# Patient Record
Sex: FEMALE | Race: WHITE | Employment: FULL TIME | URBAN - METROPOLITAN AREA
[De-identification: names, ages, dates, MRNs, and addresses within clinical notes are randomized per-mention and may not be internally consistent; named-entity substitution may affect disease eponyms.]

---

## 2018-04-25 ENCOUNTER — APPOINTMENT (OUTPATIENT)
Dept: ULTRASOUND IMAGING | Facility: CLINIC | Age: 28
End: 2018-04-25
Attending: EMERGENCY MEDICINE
Payer: COMMERCIAL

## 2018-04-25 ENCOUNTER — HOSPITAL ENCOUNTER (EMERGENCY)
Facility: CLINIC | Age: 28
Discharge: HOME | End: 2018-04-25
Attending: EMERGENCY MEDICINE | Admitting: EMERGENCY MEDICINE
Payer: COMMERCIAL

## 2018-04-25 VITALS
HEART RATE: 65 BPM | DIASTOLIC BLOOD PRESSURE: 67 MMHG | TEMPERATURE: 98.1 F | OXYGEN SATURATION: 100 % | RESPIRATION RATE: 20 BRPM | SYSTOLIC BLOOD PRESSURE: 113 MMHG

## 2018-04-25 DIAGNOSIS — O03.9 SPONTANEOUS ABORTION: ICD-10-CM

## 2018-04-25 LAB
ABO + RH BLD: NORMAL
ABO + RH BLD: NORMAL
B-HCG SERPL-ACNC: 4115 IU/L (ref 0–5)
BASOPHILS # BLD AUTO: 0.1 10E9/L (ref 0–0.2)
BASOPHILS NFR BLD AUTO: 0.6 %
BLOOD BANK CMNT PATIENT-IMP: NORMAL
BLOOD BANK CMNT PATIENT-IMP: NORMAL
DIFFERENTIAL METHOD BLD: NORMAL
EOSINOPHIL # BLD AUTO: 0.3 10E9/L (ref 0–0.7)
EOSINOPHIL NFR BLD AUTO: 3.6 %
ERYTHROCYTE [DISTWIDTH] IN BLOOD BY AUTOMATED COUNT: 12.1 % (ref 10–15)
FETAL CELL SCN BLD QL ROSETTE: NORMAL
HCT VFR BLD AUTO: 41.4 % (ref 35–47)
HGB BLD-MCNC: 13.8 G/DL (ref 11.7–15.7)
IMM GRANULOCYTES # BLD: 0 10E9/L (ref 0–0.4)
IMM GRANULOCYTES NFR BLD: 0.2 %
LYMPHOCYTES # BLD AUTO: 3.3 10E9/L (ref 0.8–5.3)
LYMPHOCYTES NFR BLD AUTO: 35 %
MCH RBC QN AUTO: 29.9 PG (ref 26.5–33)
MCHC RBC AUTO-ENTMCNC: 33.3 G/DL (ref 31.5–36.5)
MCV RBC AUTO: 90 FL (ref 78–100)
MONOCYTES # BLD AUTO: 0.6 10E9/L (ref 0–1.3)
MONOCYTES NFR BLD AUTO: 6.7 %
NEUTROPHILS # BLD AUTO: 5.1 10E9/L (ref 1.6–8.3)
NEUTROPHILS NFR BLD AUTO: 53.9 %
NRBC # BLD AUTO: 0 10*3/UL
NRBC BLD AUTO-RTO: 0 /100
PLATELET # BLD AUTO: 237 10E9/L (ref 150–450)
RBC # BLD AUTO: 4.62 10E12/L (ref 3.8–5.2)
RH IG VIALS RECOM PATIENT: NORMAL
WBC # BLD AUTO: 9.4 10E9/L (ref 4–11)

## 2018-04-25 PROCEDURE — 76801 OB US < 14 WKS SINGLE FETUS: CPT

## 2018-04-25 PROCEDURE — 85461 HEMOGLOBIN FETAL: CPT | Performed by: EMERGENCY MEDICINE

## 2018-04-25 PROCEDURE — 86901 BLOOD TYPING SEROLOGIC RH(D): CPT | Performed by: EMERGENCY MEDICINE

## 2018-04-25 PROCEDURE — 85025 COMPLETE CBC W/AUTO DIFF WBC: CPT | Performed by: EMERGENCY MEDICINE

## 2018-04-25 PROCEDURE — 86900 BLOOD TYPING SEROLOGIC ABO: CPT | Performed by: EMERGENCY MEDICINE

## 2018-04-25 PROCEDURE — 25000132 ZZH RX MED GY IP 250 OP 250 PS 637: Performed by: OBSTETRICS & GYNECOLOGY

## 2018-04-25 PROCEDURE — 84702 CHORIONIC GONADOTROPIN TEST: CPT | Performed by: EMERGENCY MEDICINE

## 2018-04-25 PROCEDURE — 99284 EMERGENCY DEPT VISIT MOD MDM: CPT | Mod: 25

## 2018-04-25 PROCEDURE — 25000132 ZZH RX MED GY IP 250 OP 250 PS 637: Performed by: EMERGENCY MEDICINE

## 2018-04-25 RX ORDER — OXYCODONE HYDROCHLORIDE 5 MG/1
5 TABLET ORAL EVERY 6 HOURS PRN
Qty: 12 TABLET | Refills: 0 | Status: ON HOLD | OUTPATIENT
Start: 2018-04-25 | End: 2021-08-05

## 2018-04-25 RX ORDER — MISOPROSTOL 200 UG/1
800 TABLET ORAL ONCE
Status: COMPLETED | OUTPATIENT
Start: 2018-04-25 | End: 2018-04-25

## 2018-04-25 RX ORDER — ACETAMINOPHEN 500 MG
1000 TABLET ORAL ONCE
Status: COMPLETED | OUTPATIENT
Start: 2018-04-25 | End: 2018-04-25

## 2018-04-25 RX ADMIN — ACETAMINOPHEN 1000 MG: 500 TABLET, FILM COATED ORAL at 05:49

## 2018-04-25 RX ADMIN — MISOPROSTOL 800 MCG: 200 TABLET ORAL at 10:20

## 2018-04-25 ASSESSMENT — ENCOUNTER SYMPTOMS
HEADACHES: 0
ABDOMINAL PAIN: 0
FEVER: 0
DIARRHEA: 0
LIGHT-HEADEDNESS: 0
NAUSEA: 0
FREQUENCY: 0
VOMITING: 0
DYSURIA: 0

## 2018-04-25 NOTE — LETTER
April 25, 2018      To Whom It May Concern:      Marce Krishnan was seen in our Emergency Department today, 04/25/18.  I expect her condition to improve over the next 3 days.  She may return to work/school when improved.    Sincerely,        Quan Rodriguez MD

## 2018-04-25 NOTE — H&P
GYN CONSULT    Patient is being seen in consultation at the request of Dr. Rodriguez.    HPI:  Pt is a 28 year old  with known fetal demise (approx 6w2d on last US per pt) who presents to the ER with vaginal bleeding, blood clots and severe cramping.     She has been seen at [facility] and had an US 18 that showed missed  (reports 6w2d with no cardiac activity, though records not available). A repeat US on 18 showed the same. She elected to proceed with expectant mgmt. She began having light bleeding and cramping on , but the bleeding became heavy with clots and possible tissue overnight and cramping and presented to the ER. She denies any dizziness/lightheadedness, SOB or CP. Was changing a heavy pad about once per hour.     OBHx:  1st pregnancy    GynHx: Denies h/o STI, abnl Pap    Med Hx: None  Surg Hx:  None    Meds:  PNV    Allergies: No Known Allergies    Soc Hx:   Lives with . HS . Denies ETOH, drug or tobacco use    Fam Hx: None pertinent    PE:    VS:  BP (!) 106/91  Pulse 73  Temp 98.1  F (36.7  C) (Oral)  Resp 16  SpO2 100%  Gen:  A&O, NAD  Lungs:  CTAB  CV:  RRR  Abd:  Soft, nontender  Pelvic: Per ER MD, os open with clot in vagina, no tissue present. Current pad with small blood, no active bleeding.     Labs:    WBC 9.4  Hgb 13.8  Plt 237  Hcg 4115  Blood type O+    Imaging: Pelvic US:   1. Thickened heterogeneous endometrium and complex material in the cervix.  2. Retained products of conception are possible. Some of this is likely blood products.    A/P:  28 year old  with incomplete SAB.   - US today with EMS of 1.5cm, has likely passed most of the tissue at this time. Discussed options of 1) Medical management with buccal misoprostol to complete SAB process or 2) Surgical management with D&C today. Given that she is clinically stable, I think it is reasonable to use medication and discharge home, which she would prefer. Ok to give  Rx for small number of oxycodone to help with cramping, continue PO tylenol & ibuprofen. Note to be out of work until next week. Call or return if severe pain, bleeding > 1 pad every 30min or s/sx anemia. Will need f/u US in 1 week either in our office or at [facility].    Jeniffer Navarro  4/25/2018  9:47 AM

## 2018-04-25 NOTE — ED PROVIDER NOTES
"  History     Chief Complaint:  \"I am having a miscarriage\"     HPI   Marce Krishnan is a 28-year-old G1 female who presents to the emergency department for evaluation of increased vaginal bleeding and the patient reports that on 4/13 she had her first obstetric ultrasound which revealed no fetal heartbeat and her physician told her she was having a miscarriage.  The patient states that she was given options about what to do and elected to wait on any procedure to see if heartbeat which show up and later ultrasound.  Unfortunately, the patient states that she had a second ultrasound on 4/19 which also revealed no fetal heartbeat.  The patient states that she wanted to try and go about this without any procedures; however, she states that she started to notice increased pain and bleeding tonight.  The patient states that her spotting started 2 days ago.  She states that tonight at 0100 she woke up with intense cramping pelvic pain and went to the bathroom after this.  She states that she was passing a large amount of clots and blood.  Since 0100 the patient states that she has gone through about 5 pads.  Patient took Tylenol for pain yesterday, but did not take anything for pain today.  She denies feeling any lightheadedness, shortness of breath, chest pain.  She has had no fever or urinary symptoms.      Allergies:  No known drug allergies.     Medications:    No daily medications.     Past Medical History:    History reviewed. No pertinent medical history.     Past Surgical History:    Surgical history reviewed. No pertinent surgical history.    Family History:    History reviewed. No pertinent family history.     Social History:  Marital Status:    Presents to the ED with    PCP: [facility]     Review of Systems   Constitutional: Negative for fever.   Gastrointestinal: Negative for abdominal pain, diarrhea, nausea and vomiting.   Genitourinary: Positive for pelvic pain, vaginal " bleeding and vaginal discharge. Negative for dysuria and frequency.   Neurological: Negative for syncope, light-headedness and headaches.   All other systems reviewed and are negative.    Physical Exam     Patient Vitals for the past 24 hrs:   BP Temp Temp src Pulse Resp SpO2   04/25/18 0602 - - - - - 100 %   04/25/18 0600 107/61 - - - - -   04/25/18 0550 103/72 - - - - 100 %   04/25/18 0428 137/75 98.1  F (36.7  C) Oral 77 18 98 %          Physical Exam    Nursing note and vitals reviewed.    Constitutional: Pleasant and well groomed.          HENT:    Mouth/Throat: Oropharynx is without swelling or erythema. Oral mucosa moist.    Eyes: Conjunctivae are normal. No scleral icterus.    Neck: Neck supple.   Cardiovascular: Normal rate, regular rhythm and intact distal pulses.    Pulmonary/Chest: Effort normal and breath sounds normal.   Abdominal: Soft.  No distension. There is no tenderness.   Musculoskeletal:  No edema, No calf tenderness  Genitourinary: Normal external genitalia. Large blood and clots in vaginal vault. Normal appearing cervix. External os open.   Neurological:Alert. Coordination normal.   Skin: Skin is warm and dry.   Psychiatric: Normal mood and affect.     Emergency Department Course   Imaging:  US OB <14 Weeks Single  1. Thickened heterogeneous endometrium and complex material in the cervix.  2. Retained products of conception are possible. Some of this is likely blood products.    Radiographic findings were communicated with the patient who voiced understanding of the findings.    Laboratory:  Blood:  CBC:  WBC 9.4, HGB 13.8, , otherwise WNL  HCG Quantitative Pregnancy: 4115      Rh Immune Globulin Study: O+.     Interventions:  (1293) Tylenol, 1000 mg, PO     Emergency Department Course:  Nursing notes and vitals reviewed.    (4118) I entered the room with my scribe, obtained the history, and performed an exam of the patient as documented above.    A peripheral IV was established. Blood  was drawn from the patient. This was sent for laboratory testing, findings above.      The patient was sent for an obstetric ultrasound while in the emergency department, findings above.      (1859) Patient updated and reevaluated. I repeated the pelvic exam. Still moderate blood and os opened. Plan observation. Dr. Rodriguez will accept ongoing care of the patient.     Findings and plan explained to the patient.       Impression & Plan    Medical Decision Making:  This patient presents to the ED with vaginal bleeding during pregnancy. She reported being diagnosed with an intrauterine fetal demise at about 6 weeks and has had 2 US without cardiac activity. She is not aware of her BhcG and does not believe she had a rhogam shot however her clinic is not open to confirm this information. She denies infertility treatment. The differential diagnosis included but was not limited to spontaneous , ectopic pregnancy,incomplete spontaneous . Her ED evaluation is as noted above. Her Rh is positive. US obtained with results noted above. . At this point I feel that the most likely cause of the vaginal bleeding is ongoing spontaneous . There are no symptoms or findings concerning for ectopic pregnancy. At this point she is incomplete and still bleeding. Plan observation in the ED with likely discharge home and follow up with her OB/Gyn.   Plan:   Refer to Dr. Rodriguez's addendum for final disposition.     Diagnosis:    ICD-10-CM    1. Spontaneous  O03.9        Disposition:  To be determined.             [facility] EMERGENCY DEPARTMENT      Scribe Disclosure:  Bernard SLATER, am serving as a scribe on 2018 at 4:51 AM to personally document services performed by Dr. Vikki Gomez based on my observations and the provider's statements to me.                Vikki Gomez MD  [phone]

## 2018-04-25 NOTE — ED AVS SNAPSHOT
[facility] Emergency Department    [address]    [facility] [address]    Phone:  [phone]    Fax:  [phone]                                       Marce Krishnan   MRN: [id number]    Department:  [facility] Emergency Department   Date of Visit:  4/25/2018           After Visit Summary Signature Page     I have received my discharge instructions, and my questions have been answered. I have discussed any challenges I see with this plan with the nurse or doctor.    ..........................................................................................................................................  Patient/Patient Representative Signature      ..........................................................................................................................................  Patient Representative Print Name and Relationship to Patient    ..................................................               ................................................  Date                                            Time    ..........................................................................................................................................  Reviewed by Signature/Title    ...................................................              ..............................................  Date                                                            Time

## 2018-04-25 NOTE — ED TRIAGE NOTES
Pt to ER with c/o heavy vag bleeding pt was told on April 13 th that she was having a miscarrage, now increased bleeding and pain

## 2018-04-25 NOTE — DISCHARGE INSTRUCTIONS
Discharge Instructions  Vaginal Bleeding in Pregnancy    Bleeding in early pregnancy can be a sign of a miscarriage or an abnormal pregnancy, but often is innocent and the pregnancy will continue normally. We may do blood pregnancy tests and ultrasound to try to determine what is causing the bleeding, but sometimes we are unable to tell. If this is the case, it will often require more time, more blood tests, and another ultrasound.     Generally, every Emergency Department visit should have a follow-up clinic visit with either a primary or a specialty clinic/provider. Please follow-up as instructed by your emergency provider today.    Return to the Emergency Department if:    You have severe abdominal (belly) or pelvic pain.    You faint, or feel lightheaded or dizzy.     Your bleeding gets much worse.    You pass any tissue--solid material that does not look like a blood clot. If you pass tissue, save it (even if you have to pull it out of the toilet) and put it in a plastic bag or jar and bring it in.      You have a fever of 100.5 F or higher.  If no pregnancy could be seen:    It may be that everything is normal and it is just too early to see the pregnancy. It is also possible that you could have an ectopic pregnancy, which is a pregnancy in an abnormal location, such as in the tube ( tubal pregnancy ). We don t see evidence that this is likely today but we cannot exclude it with certainty until a pregnancy can be seen in the uterus (womb) and an ectopic pregnancy can cause severe internal bleeding or death so follow-up is very important.    You should not be alone, in case you suddenly become very sick.     You should not have sex or put anything in your vagina.     If a pregnancy was seen in your uterus:    If a heartbeat could be seen, the chance of miscarriage is lower but because you had bleeding the chance of a miscarriage still exists.    You should not have sex or put anything in your  vagina.    Follow-up as directed with your OB/GYN provider.     Facts about miscarriage: We hope you do not have a miscarriage, but if you do, here are important things to know:    Early miscarriage is very common, and having one miscarriage does not mean you will have problems with another pregnancy.    Nothing you did caused it. Taking medicine, drinking alcohol, having sex, exercising, or falling down will not cause a miscarriage.     If you were given a prescription for medicine here today, be sure to read all of the information (including the package insert) that comes with your prescription.  This will include important information about the medicine, its side effects, and any warnings that you need to know about.  The pharmacist who fills the prescription can provide more information and answer questions you may have about the medicine.  If you have questions or concerns that the pharmacist cannot address, please call or return to the Emergency Department.   Remember that you can always come back to the Emergency Department if you are not able to see your regular provider in the amount of time listed above, if you get any new symptoms, or if there is anything that worries you.

## 2018-04-25 NOTE — ED NOTES
Dr Gomez asked me to make sure this patient was feeling well enough to go home after a period of observation.  She continues to complain of bleeding but not as bad as when she arrived.  She ambulated well.  She asked about contacting OB about possible procedure.  [facility] Ramon came down and evaluated her.  She prescribed an out patient plan to assist patient is completion of her miscarriage and instructions for follow up.     Quan Rodriguez MD  04/25/18 1917

## 2018-04-25 NOTE — ED AVS SNAPSHOT
[facility] Emergency Department    [address]    [facility] [address]    Phone:  [phone]    Fax:  [phone]                                       Marce Krishnan   MRN: [id number]    Department:  [facility] Emergency Department   Date of Visit:  2018           Patient Information     Date Of Birth          1990        Your diagnoses for this visit were:     Spontaneous         You were seen by Vikki Gomez MD and Quan Rodriguez MD.      Follow-up Information     Follow up with [facility] In 1 day.    Contact information:    [address]  [facility] [address]          Follow up with [facility] Emergency Department.    Specialty:  EMERGENCY MEDICINE    Why:  If symptoms worsen    Contact information:    [address]  [facility] [address] [phone]        Discharge Instructions       Discharge Instructions  Vaginal Bleeding in Pregnancy    Bleeding in early pregnancy can be a sign of a miscarriage or an abnormal pregnancy, but often is innocent and the pregnancy will continue normally. We may do blood pregnancy tests and ultrasound to try to determine what is causing the bleeding, but sometimes we are unable to tell. If this is the case, it will often require more time, more blood tests, and another ultrasound.     Generally, every Emergency Department visit should have a follow-up clinic visit with either a primary or a specialty clinic/provider. Please follow-up as instructed by your emergency provider today.    Return to the Emergency Department if:    You have severe abdominal (belly) or pelvic pain.    You faint, or feel lightheaded or dizzy.     Your bleeding gets much worse.    You pass any tissue--solid material that does not look like a blood clot. If you pass tissue, save it (even if you have to pull it out of the toilet) and put it in a plastic bag or jar and bring it  in.      You have a fever of 100.5 F or higher.  If no pregnancy could be seen:    It may be that everything is normal and it is just too early to see the pregnancy. It is also possible that you could have an ectopic pregnancy, which is a pregnancy in an abnormal location, such as in the tube ( tubal pregnancy ). We don t see evidence that this is likely today but we cannot exclude it with certainty until a pregnancy can be seen in the uterus (womb) and an ectopic pregnancy can cause severe internal bleeding or death so follow-up is very important.    You should not be alone, in case you suddenly become very sick.     You should not have sex or put anything in your vagina.     If a pregnancy was seen in your uterus:    If a heartbeat could be seen, the chance of miscarriage is lower but because you had bleeding the chance of a miscarriage still exists.    You should not have sex or put anything in your vagina.    Follow-up as directed with your OB/GYN provider.     Facts about miscarriage: We hope you do not have a miscarriage, but if you do, here are important things to know:    Early miscarriage is very common, and having one miscarriage does not mean you will have problems with another pregnancy.    Nothing you did caused it. Taking medicine, drinking alcohol, having sex, exercising, or falling down will not cause a miscarriage.     If you were given a prescription for medicine here today, be sure to read all of the information (including the package insert) that comes with your prescription.  This will include important information about the medicine, its side effects, and any warnings that you need to know about.  The pharmacist who fills the prescription can provide more information and answer questions you may have about the medicine.  If you have questions or concerns that the pharmacist cannot address, please call or return to the Emergency Department.   Remember that you can always come back to the  Emergency Department if you are not able to see your regular provider in the amount of time listed above, if you get any new symptoms, or if there is anything that worries you.    24 Hour Appointment Hotline       To make an appointment at any [facility], call [phone] ([phone]). If you don't have a family doctor or clinic, we will help you find one. [place] clinics are conveniently located to serve the needs of you and your family.             Review of your medicines      START taking        Dose / Directions Last dose taken    oxyCODONE IR 5 MG tablet   Commonly known as:  ROXICODONE   Dose:  5 mg   Quantity:  12 tablet        Take 1 tablet (5 mg) by mouth every 6 hours as needed for pain   Refills:  0                Information about OPIOIDS     PRESCRIPTION OPIOIDS: WHAT YOU NEED TO KNOW   You have a prescription for an opioid (narcotic) pain medicine. Opioids can cause addiction. If you have a history of chemical dependency of any type, you are at a higher risk of becoming addicted to opioids. Only take this medicine after all other options have been tried. Take it for as short a time and as few doses as possible.     Do not:    Drive. If you drive while taking these medicines, you could be arrested for driving under the influence (DUI).    Operate heavy machinery    Do any other dangerous activities while taking these medicines.     Drink any alcohol while taking these medicines.      Take with any other medicines that contain acetaminophen. Read all labels carefully. Look for the word  acetaminophen  or  Tylenol.  Ask your pharmacist if you have questions or are unsure.    Store your pills in a secure place, locked if possible. We will not replace any lost or stolen medicine. If you don t finish your medicine, please throw away (dispose) as directed by your pharmacist. The [place] [employer]ution [employer] has more information about safe disposal:  https://www.pca.[facility].[place].us/living-green/managing-unwanted-medications    All opioids tend to cause constipation. Drink plenty of water and eat foods that have a lot of fiber, such as fruits, vegetables, prune juice, apple juice and high-fiber cereal. Take a laxative (Miralax, milk of magnesia, Colace, Senna) if you don t move your bowels at least every other day.         Prescriptions were sent or printed at these locations (1 Prescription)                   Other Prescriptions                Printed at Department/Unit printer (1 of 1)         oxyCODONE IR (ROXICODONE) 5 MG tablet                Procedures and tests performed during your visit     CBC with platelets differential    HCG quantitative pregnancy    Patient care order    Rh Immune Globulin Study    Rho (D) immune globulin (RhoGam) Lab Study    US OB < 14 Weeks Single      Orders Needing Specimen Collection     None      Pending Results     No orders found from 4/23/2018 to 4/26/2018.            Pending Culture Results     No orders found from 4/23/2018 to 4/26/2018.            Pending Results Instructions     If you had any lab results that were not finalized at the time of your Discharge, you can call the ED Lab Result RN at [phone]. You will be contacted by this team for any positive Lab results or changes in treatment. The nurses are available 7 days a week from 10A to 6:30P.  You can leave a message 24 hours per day and they will return your call.        Test Results From Your Hospital Stay        4/25/2018  5:02 AM      Component Results     Component Value Ref Range & Units Status    WBC 9.4 4.0 - 11.0 10e9/L Final    RBC Count 4.62 3.8 - 5.2 10e12/L Final    Hemoglobin 13.8 11.7 - 15.7 g/dL Final    Hematocrit 41.4 35.0 - 47.0 % Final    MCV 90 78 - 100 fl Final    MCH 29.9 26.5 - 33.0 pg Final    MCHC 33.3 31.5 - 36.5 g/dL Final    RDW 12.1 10.0 - 15.0 % Final    Platelet Count 237 150 - 450 10e9/L Final    Diff Method Automated Method   Final    % Neutrophils 53.9 % Final    % Lymphocytes 35.0 % Final    % Monocytes 6.7 % Final    % Eosinophils 3.6 % Final    % Basophils 0.6 % Final    % Immature Granulocytes 0.2 % Final    Nucleated RBCs 0 0 /100 Final    Absolute Neutrophil 5.1 1.6 - 8.3 10e9/L Final    Absolute Lymphocytes 3.3 0.8 - 5.3 10e9/L Final    Absolute Monocytes 0.6 0.0 - 1.3 10e9/L Final    Absolute Eosinophils 0.3 0.0 - 0.7 10e9/L Final    Absolute Basophils 0.1 0.0 - 0.2 10e9/L Final    Abs Immature Granulocytes 0.0 0 - 0.4 10e9/L Final    Absolute Nucleated RBC 0.0  Final         4/25/2018  5:31 AM      Component Results     Component Value Ref Range & Units Status    HCG Quantitative Serum 4115 (H) 0 - 5 IU/L Final    Specimen run with a dilution         4/25/2018  5:27 AM      Component Results     Component    Rhogam Order    Order received    Comment:    See Rhogam Study/Suitability               4/25/2018  6:07 AM      Component Results     Component    ABO    O    RH(D)    Pos    Fetal Blood Screen    Canceled, Test credited    Blood Bank Comment    Not suitable for Rh Immune Globulin  PATIENT IS RH POSITIVE      Amount of RHIG Required    Not suitable for Rh Immune Globulin         4/25/2018  7:05 AM      Narrative     US OB <14 WEEKS WITH TRANSVAGINAL SINGLE  4/25/2018 5:48 AM     INDICATION: Vaginal bleeding, known fetal demise.    COMPARISON: None.    FINDINGS: Transabdominal ultrasound demonstrates no IUP. Endometrial  stripe measures 1.5 cm in thickness and is slightly heterogeneous. On  color Doppler images there is a suggestion of some minimal blood flow  within the endometrial thickening. There is also heterogeneous  material in the cervix much of which is likely blood products. Ovaries  are negative. No free fluid.        Impression     IMPRESSION:  1. Thickened heterogeneous endometrium and complex material in the  cervix.  2. Retained products of conception are possible. Some of this is  likely blood  products.    YARA BOWLES MD                Clinical Quality Measure: Blood Pressure Screening     Your blood pressure was checked while you were in the emergency department today. The last reading we obtained was  BP: 113/67 . Please read the guidelines below about what these numbers mean and what you should do about them.  If your systolic blood pressure (the top number) is less than 120 and your diastolic blood pressure (the bottom number) is less than 80, then your blood pressure is normal. There is nothing more that you need to do about it.  If your systolic blood pressure (the top number) is 120-139 or your diastolic blood pressure (the bottom number) is 80-89, your blood pressure may be higher than it should be. You should have your blood pressure rechecked within a year by a primary care provider.  If your systolic blood pressure (the top number) is 140 or greater or your diastolic blood pressure (the bottom number) is 90 or greater, you may have high blood pressure. High blood pressure is treatable, but if left untreated over time it can put you at risk for heart attack, stroke, or kidney failure. You should have your blood pressure rechecked by a primary care provider within the next 4 weeks.  If your provider in the emergency department today gave you specific instructions to follow-up with your doctor or provider even sooner than that, you should follow that instruction and not wait for up to 4 weeks for your follow-up visit.        Thank you for choosing [facility]       Thank you for choosing [facility] for your care. Our goal is always to provide you with excellent care. Hearing back from our patients is one way we can continue to improve our services. Please take a few minutes to complete the written survey that you may receive in the mail after you visit with us. Thank you!        [employer] Information     [employer] lets you send messages to your doctor, view your test results, renew your  "prescriptions, schedule appointments and more. To sign up, go to [email or web address] . Click on \"Log in\" on the left side of the screen, which will take you to the Welcome page. Then click on \"Sign up Now\" on the right side of the page.     You will be asked to enter the access code listed below, as well as some personal information. Please follow the directions to create your username and password.     Your access code is: [email or web address]  Expires: 2018 10:43 AM     Your access code will  in 90 days. If you need help or a new code, please call your [place] clinic or [phone].        Care EveryWhere ID     This is your Care EveryWhere ID. This could be used by other organizations to access your [place] medical records  [id number]        Equal Access to Services     COLIN GUERRA : Milan Tang, nery hoff, melinda baer, ronit olivo . So [facility] [phone].    ATENCIÓN: Si habla español, tiene a perry disposición servicios gratuitos de asistencia lingüística. Llame al [phone].    We comply with applicable federal civil rights laws and [place] laws. We do not discriminate on the basis of race, color, national origin, age, disability, sex, sexual orientation, or gender identity.            After Visit Summary       This is your record. Keep this with you and show to your community pharmacist(s) and doctor(s) at your next visit.                  "

## 2019-07-08 ENCOUNTER — RECORDS - HEALTHEAST (OUTPATIENT)
Dept: ADMINISTRATIVE | Facility: OTHER | Age: 29
End: 2019-07-08

## 2019-07-10 ENCOUNTER — HOME CARE/HOSPICE - HEALTHEAST (OUTPATIENT)
Dept: HOME HEALTH SERVICES | Facility: HOME HEALTH | Age: 29
End: 2019-07-10

## 2019-07-12 ENCOUNTER — HOME CARE/HOSPICE - HEALTHEAST (OUTPATIENT)
Dept: HOME HEALTH SERVICES | Facility: HOME HEALTH | Age: 29
End: 2019-07-12

## 2021-01-25 LAB
HEPATITIS B SURFACE ANTIGEN (EXTERNAL): NONREACTIVE
HIV1+2 AB SERPL QL IA: NONREACTIVE
RUBELLA ANTIBODY IGG (EXTERNAL): POSITIVE
VDRL (SYPHILIS) (EXTERNAL): NONREACTIVE

## 2021-04-19 ENCOUNTER — MEDICAL CORRESPONDENCE (OUTPATIENT)
Dept: HEALTH INFORMATION MANAGEMENT | Facility: CLINIC | Age: 31
End: 2021-04-19

## 2021-04-19 ENCOUNTER — TRANSFERRED RECORDS (OUTPATIENT)
Dept: HEALTH INFORMATION MANAGEMENT | Facility: CLINIC | Age: 31
End: 2021-04-19

## 2021-04-20 ENCOUNTER — TRANSCRIBE ORDERS (OUTPATIENT)
Dept: MATERNAL FETAL MEDICINE | Facility: CLINIC | Age: 31
End: 2021-04-20

## 2021-04-20 DIAGNOSIS — O26.90 PREGNANCY RELATED CONDITION, ANTEPARTUM: Primary | ICD-10-CM

## 2021-04-23 ENCOUNTER — PRE VISIT (OUTPATIENT)
Dept: MATERNAL FETAL MEDICINE | Facility: CLINIC | Age: 31
End: 2021-04-23

## 2021-04-27 ENCOUNTER — OFFICE VISIT (OUTPATIENT)
Dept: MATERNAL FETAL MEDICINE | Facility: CLINIC | Age: 31
End: 2021-04-27
Attending: OBSTETRICS & GYNECOLOGY
Payer: COMMERCIAL

## 2021-04-27 ENCOUNTER — HOSPITAL ENCOUNTER (OUTPATIENT)
Dept: ULTRASOUND IMAGING | Facility: CLINIC | Age: 31
End: 2021-04-27
Attending: OBSTETRICS & GYNECOLOGY
Payer: COMMERCIAL

## 2021-04-27 DIAGNOSIS — O26.90 PREGNANCY RELATED CONDITION, ANTEPARTUM: ICD-10-CM

## 2021-04-27 PROCEDURE — 76811 OB US DETAILED SNGL FETUS: CPT | Mod: 26 | Performed by: OBSTETRICS & GYNECOLOGY

## 2021-04-27 PROCEDURE — 76811 OB US DETAILED SNGL FETUS: CPT

## 2021-04-27 NOTE — PROGRESS NOTES
"Please see \"Imaging\" tab under Chart Review for full details.    Nohemy High MD  Maternal Fetal Medicine    "

## 2021-05-12 ENCOUNTER — OFFICE VISIT (OUTPATIENT)
Dept: MATERNAL FETAL MEDICINE | Facility: CLINIC | Age: 31
End: 2021-05-12
Attending: OBSTETRICS & GYNECOLOGY
Payer: COMMERCIAL

## 2021-05-12 ENCOUNTER — HOSPITAL ENCOUNTER (OUTPATIENT)
Dept: ULTRASOUND IMAGING | Facility: CLINIC | Age: 31
End: 2021-05-12
Attending: OBSTETRICS & GYNECOLOGY
Payer: COMMERCIAL

## 2021-05-12 PROCEDURE — 76816 OB US FOLLOW-UP PER FETUS: CPT

## 2021-05-12 PROCEDURE — 76816 OB US FOLLOW-UP PER FETUS: CPT | Mod: 26 | Performed by: OBSTETRICS & GYNECOLOGY

## 2021-05-12 NOTE — PROGRESS NOTES
"Please see \"Imaging\" tab under \"Chart Review\" for details of today's US at the [facility].    Chaparro Jimenez MD  Maternal-Fetal Medicine    "

## 2021-06-04 ENCOUNTER — HOSPITAL ENCOUNTER (EMERGENCY)
Facility: CLINIC | Age: 31
Discharge: HOME | End: 2021-06-04
Attending: EMERGENCY MEDICINE | Admitting: EMERGENCY MEDICINE
Payer: COMMERCIAL

## 2021-06-04 VITALS
RESPIRATION RATE: 16 BRPM | HEART RATE: 116 BPM | OXYGEN SATURATION: 96 % | DIASTOLIC BLOOD PRESSURE: 70 MMHG | SYSTOLIC BLOOD PRESSURE: 119 MMHG | TEMPERATURE: 97.1 F

## 2021-06-04 DIAGNOSIS — R21 RASH: ICD-10-CM

## 2021-06-04 LAB
ALBUMIN SERPL-MCNC: 2.7 G/DL (ref 3.4–5)
ALP SERPL-CCNC: 105 U/L (ref 40–150)
ALT SERPL W P-5'-P-CCNC: 18 U/L (ref 0–50)
ANION GAP SERPL CALCULATED.3IONS-SCNC: 4 MMOL/L (ref 3–14)
AST SERPL W P-5'-P-CCNC: 10 U/L (ref 0–45)
BASOPHILS # BLD AUTO: 0 10E9/L (ref 0–0.2)
BASOPHILS NFR BLD AUTO: 0.2 %
BILIRUB SERPL-MCNC: 0.3 MG/DL (ref 0.2–1.3)
BUN SERPL-MCNC: 8 MG/DL (ref 7–30)
CALCIUM SERPL-MCNC: 8.7 MG/DL (ref 8.5–10.1)
CHLORIDE SERPL-SCNC: 108 MMOL/L (ref 94–109)
CO2 SERPL-SCNC: 26 MMOL/L (ref 20–32)
CREAT SERPL-MCNC: 0.65 MG/DL (ref 0.52–1.04)
DIFFERENTIAL METHOD BLD: ABNORMAL
EOSINOPHIL # BLD AUTO: 1 10E9/L (ref 0–0.7)
EOSINOPHIL NFR BLD AUTO: 7.1 %
ERYTHROCYTE [DISTWIDTH] IN BLOOD BY AUTOMATED COUNT: 12.5 % (ref 10–15)
GFR SERPL CREATININE-BSD FRML MDRD: >90 ML/MIN/{1.73_M2}
GLUCOSE SERPL-MCNC: 91 MG/DL (ref 70–99)
HCT VFR BLD AUTO: 39.2 % (ref 35–47)
HGB BLD-MCNC: 13 G/DL (ref 11.7–15.7)
IMM GRANULOCYTES # BLD: 0.1 10E9/L (ref 0–0.4)
IMM GRANULOCYTES NFR BLD: 0.5 %
LYMPHOCYTES # BLD AUTO: 2.6 10E9/L (ref 0.8–5.3)
LYMPHOCYTES NFR BLD AUTO: 19.4 %
MCH RBC QN AUTO: 30 PG (ref 26.5–33)
MCHC RBC AUTO-ENTMCNC: 33.2 G/DL (ref 31.5–36.5)
MCV RBC AUTO: 90 FL (ref 78–100)
MONOCYTES # BLD AUTO: 0.8 10E9/L (ref 0–1.3)
MONOCYTES NFR BLD AUTO: 6.2 %
NEUTROPHILS # BLD AUTO: 9.1 10E9/L (ref 1.6–8.3)
NEUTROPHILS NFR BLD AUTO: 66.6 %
NRBC # BLD AUTO: 0 10*3/UL
NRBC BLD AUTO-RTO: 0 /100
PLATELET # BLD AUTO: 245 10E9/L (ref 150–450)
POTASSIUM SERPL-SCNC: 3.5 MMOL/L (ref 3.4–5.3)
PROT SERPL-MCNC: 6.9 G/DL (ref 6.8–8.8)
RBC # BLD AUTO: 4.34 10E12/L (ref 3.8–5.2)
SODIUM SERPL-SCNC: 138 MMOL/L (ref 133–144)
WBC # BLD AUTO: 13.6 10E9/L (ref 4–11)

## 2021-06-04 PROCEDURE — 80053 COMPREHEN METABOLIC PANEL: CPT | Performed by: EMERGENCY MEDICINE

## 2021-06-04 PROCEDURE — 85025 COMPLETE CBC W/AUTO DIFF WBC: CPT | Performed by: EMERGENCY MEDICINE

## 2021-06-04 PROCEDURE — 99283 EMERGENCY DEPT VISIT LOW MDM: CPT

## 2021-06-04 ASSESSMENT — ENCOUNTER SYMPTOMS
VOMITING: 0
FEVER: 0
CHILLS: 0

## 2021-06-04 NOTE — ED TRIAGE NOTES
Patient presents with diffuse rash/hives throughout body for the past 4 weeks, worse in the past 2 weeks. Itchy, no pain. No relief with Claritin.

## 2021-06-05 NOTE — ED PROVIDER NOTES
"  History   Chief Complaint:  Rash    The history is provided by the patient.      Marce Krishnan is an 8 week pregnant 31 year old female with history of anxiety who presents with rash. The patient reports having a rash that started on her left upper thigh 3 weeks ago. Since then, the rash has spread to her entire body. She notes having hives that have caused severe itchiness. At night her rash feels like \"fire\" and prevents her from sleeping. She started a new detergent recently but after having this rash she switched back to her old one. She went to urgent care on 5/23/2021 and was given a steroid cream which did not help her symptoms. Here in the ED she denies fever, chills, and emesis.    Review of Systems   Constitutional: Negative for chills and fever.   Gastrointestinal: Negative for abdominal pain and vomiting.   Genitourinary: Negative for vaginal bleeding and vaginal discharge.   Skin: Positive for rash.   All other systems reviewed and are negative.      Allergies:  The patient has no known allergies.     Medications:  The patient is not currently taking any prescribed medications.    Past Medical History:    Anxiety    Family History  Cancer    Social History:  The patient presents by herself.  The patient works as a .  Physical Exam     Patient Vitals for the past 24 hrs:   BP Temp Temp src Pulse Resp SpO2   06/04/21 1812 119/70 97.1  F (36.2  C) Temporal 116 16 96 %       Physical Exam  Constitutional: Well appearing.  HEENT: Atraumatic.  Sclera normal bilaterally.  Moist mucous membranes.  Neck: Soft.  Supple.    Cardiac: Regular rate and rhythm.  No murmur or rub.  Respiratory: Clear to auscultation bilaterally.  No respiratory distress.    Abdomen: Gravid abdomen that is nontender to palpation.  Musculoskeletal: No edema.  Normal range of motion.  Neurologic: Alert and oriented.  Normal tone and bulk.  Normal gait.  Skin: Diffuse area of plaque some papules on the abdomen, lower " extremities, and upper extremities, mainly on the medial and anterior upper arm.  Multiple areas of scratched and scabbed lesions on the legs with no secondary signs of infection.  No petechiae or purpura.  Psych: Normal affect.  Normal behavior.    Emergency Department Course   Laboratory:    CBC: WBC: 13.6 (H), HGB: 13.0, PLT: 245  CMP: Albumin: 2.7 (L), o/w WNL (Creatinine: 0.65)    Emergency Department Course:    Reviewed:     I reviewed the patient's nursing notes, vitals, past medical records, Care Everywhere.     Assessments:    2119: I performed an exam of the patient and obtained history, as documented above.    2305: I rechecked the patient and updated them on findings. They are amenable to discharge.     Disposition:  The patient was discharged to home.       Impression & Plan   Medical Decision Making:  Marce Krishnan is a 31-year-old woman who is afebrile and hemodynamically stable.  She has a diffuse cluster of plaques and papular rash noted most notably on the abdomen and extremities.  Does not involve the mouth, soles, or palms.  She is otherwise hemodynamically stable.  There is no evidence of any malignant type rash.  We discussed potential PUPPPS.  We discussed continued topical corticosteroid and attempting Benadryl for symptomatic treatment.  She is going to see dermatology and I recommended close follow-up dermatology and discussion with OB/GYN about systemic steroids and she is in agreement.  At this point, I again see no malignant type rash and I think she is safe for discharge home.  There is no pregnancy concerns.  We discussed supportive care at home and she was in no distress at time of discharge.    Diagnosis:    ICD-10-CM    1. Rash  R21      Scribe Disclosure:  I, Derek Marquez, am serving as a scribe at 8:36 PM on 6/4/2021 to document services personally performed by Osmar Stiles MD based on my observations and the provider's statements to me.         Osmar Stiles,  MD  06/06/21 0051

## 2021-06-06 ASSESSMENT — ENCOUNTER SYMPTOMS: ABDOMINAL PAIN: 0

## 2021-06-16 PROBLEM — Z34.90 PREGNANT: Status: ACTIVE | Noted: 2019-07-08

## 2021-07-12 LAB — GROUP B STREPTOCOCCUS (EXTERNAL): NEGATIVE

## 2021-08-02 LAB — COVID-19 VIRUS PCR: NEGATIVE

## 2021-08-03 ENCOUNTER — HOSPITAL ENCOUNTER (INPATIENT)
Facility: CLINIC | Age: 31
LOS: 2 days | Discharge: HOME | End: 2021-08-05
Attending: OBSTETRICS & GYNECOLOGY | Admitting: OBSTETRICS & GYNECOLOGY
Payer: COMMERCIAL

## 2021-08-03 LAB
ABO/RH(D): NORMAL
ANTIBODY SCREEN: NEGATIVE
BASOPHILS # BLD AUTO: 0 10E3/UL (ref 0–0.2)
BASOPHILS NFR BLD AUTO: 0 %
CRYSTALS AMN MICRO: NORMAL
EOSINOPHIL # BLD AUTO: 0 10E3/UL (ref 0–0.7)
EOSINOPHIL NFR BLD AUTO: 0 %
ERYTHROCYTE [DISTWIDTH] IN BLOOD BY AUTOMATED COUNT: 13.6 % (ref 10–15)
HCT VFR BLD AUTO: 41.2 % (ref 35–47)
HGB BLD-MCNC: 13.3 G/DL (ref 11.7–15.7)
IMM GRANULOCYTES # BLD: 0.1 10E3/UL
IMM GRANULOCYTES NFR BLD: 1 %
LYMPHOCYTES # BLD AUTO: 1.9 10E3/UL (ref 0.8–5.3)
LYMPHOCYTES NFR BLD AUTO: 13 %
MCH RBC QN AUTO: 28.9 PG (ref 26.5–33)
MCHC RBC AUTO-ENTMCNC: 32.3 G/DL (ref 31.5–36.5)
MCV RBC AUTO: 89 FL (ref 78–100)
MONOCYTES # BLD AUTO: 0.8 10E3/UL (ref 0–1.3)
MONOCYTES NFR BLD AUTO: 5 %
NEUTROPHILS # BLD AUTO: 11.8 10E3/UL (ref 1.6–8.3)
NEUTROPHILS NFR BLD AUTO: 81 %
NRBC # BLD AUTO: 0 10E3/UL
NRBC BLD AUTO-RTO: 0 /100
PLATELET # BLD AUTO: 257 10E3/UL (ref 150–450)
RBC # BLD AUTO: 4.61 10E6/UL (ref 3.8–5.2)
RUPTURE OF FETAL MEMBRANES BY ROM PLUS: POSITIVE
SPECIMEN EXPIRATION DATE: NORMAL
WBC # BLD AUTO: 14.6 10E3/UL (ref 4–11)

## 2021-08-03 PROCEDURE — 86780 TREPONEMA PALLIDUM: CPT | Performed by: OBSTETRICS & GYNECOLOGY

## 2021-08-03 PROCEDURE — 250N000009 HC RX 250: Performed by: OBSTETRICS & GYNECOLOGY

## 2021-08-03 PROCEDURE — 250N000013 HC RX MED GY IP 250 OP 250 PS 637: Performed by: OBSTETRICS & GYNECOLOGY

## 2021-08-03 PROCEDURE — 86901 BLOOD TYPING SEROLOGIC RH(D): CPT | Performed by: OBSTETRICS & GYNECOLOGY

## 2021-08-03 PROCEDURE — 36415 COLL VENOUS BLD VENIPUNCTURE: CPT | Performed by: OBSTETRICS & GYNECOLOGY

## 2021-08-03 PROCEDURE — 250N000011 HC RX IP 250 OP 636: Performed by: OBSTETRICS & GYNECOLOGY

## 2021-08-03 PROCEDURE — 85004 AUTOMATED DIFF WBC COUNT: CPT | Performed by: OBSTETRICS & GYNECOLOGY

## 2021-08-03 PROCEDURE — 722N000001 HC LABOR CARE VAGINAL DELIVERY SINGLE

## 2021-08-03 PROCEDURE — 84112 EVAL AMNIOTIC FLUID PROTEIN: CPT | Performed by: OBSTETRICS & GYNECOLOGY

## 2021-08-03 PROCEDURE — 0KQM0ZZ REPAIR PERINEUM MUSCLE, OPEN APPROACH: ICD-10-PCS | Performed by: OBSTETRICS & GYNECOLOGY

## 2021-08-03 PROCEDURE — 88305 TISSUE EXAM BY PATHOLOGIST: CPT | Mod: TC | Performed by: OBSTETRICS & GYNECOLOGY

## 2021-08-03 PROCEDURE — 258N000003 HC RX IP 258 OP 636: Performed by: OBSTETRICS & GYNECOLOGY

## 2021-08-03 PROCEDURE — 120N000001 HC R&B MED SURG/OB

## 2021-08-03 PROCEDURE — G0463 HOSPITAL OUTPT CLINIC VISIT: HCPCS

## 2021-08-03 PROCEDURE — 0UBMXZX EXCISION OF VULVA, EXTERNAL APPROACH, DIAGNOSTIC: ICD-10-PCS | Performed by: OBSTETRICS & GYNECOLOGY

## 2021-08-03 RX ORDER — BISACODYL 10 MG
10 SUPPOSITORY, RECTAL RECTAL DAILY PRN
Status: DISCONTINUED | OUTPATIENT
Start: 2021-08-03 | End: 2021-08-05 | Stop reason: HOSPADM

## 2021-08-03 RX ORDER — DOCUSATE SODIUM 100 MG/1
100 CAPSULE, LIQUID FILLED ORAL DAILY
Status: DISCONTINUED | OUTPATIENT
Start: 2021-08-04 | End: 2021-08-05 | Stop reason: HOSPADM

## 2021-08-03 RX ORDER — PRENATAL VIT/IRON FUM/FOLIC AC 27MG-0.8MG
1 TABLET ORAL DAILY
COMMUNITY

## 2021-08-03 RX ORDER — FENTANYL CITRATE 50 UG/ML
50-100 INJECTION, SOLUTION INTRAMUSCULAR; INTRAVENOUS
Status: DISCONTINUED | OUTPATIENT
Start: 2021-08-03 | End: 2021-08-03 | Stop reason: HOSPADM

## 2021-08-03 RX ORDER — NALOXONE HYDROCHLORIDE 0.4 MG/ML
0.2 INJECTION, SOLUTION INTRAMUSCULAR; INTRAVENOUS; SUBCUTANEOUS
Status: DISCONTINUED | OUTPATIENT
Start: 2021-08-03 | End: 2021-08-03 | Stop reason: HOSPADM

## 2021-08-03 RX ORDER — OXYTOCIN/0.9 % SODIUM CHLORIDE 30/500 ML
340 PLASTIC BAG, INJECTION (ML) INTRAVENOUS CONTINUOUS PRN
Status: DISCONTINUED | OUTPATIENT
Start: 2021-08-03 | End: 2021-08-03 | Stop reason: HOSPADM

## 2021-08-03 RX ORDER — METHYLERGONOVINE MALEATE 0.2 MG/ML
200 INJECTION INTRAVENOUS
Status: DISCONTINUED | OUTPATIENT
Start: 2021-08-03 | End: 2021-08-05 | Stop reason: HOSPADM

## 2021-08-03 RX ORDER — TRANEXAMIC ACID 10 MG/ML
1 INJECTION, SOLUTION INTRAVENOUS EVERY 30 MIN PRN
Status: DISCONTINUED | OUTPATIENT
Start: 2021-08-03 | End: 2021-08-05 | Stop reason: HOSPADM

## 2021-08-03 RX ORDER — MISOPROSTOL 200 UG/1
400 TABLET ORAL
Status: DISCONTINUED | OUTPATIENT
Start: 2021-08-03 | End: 2021-08-03 | Stop reason: HOSPADM

## 2021-08-03 RX ORDER — PROCHLORPERAZINE MALEATE 10 MG
10 TABLET ORAL EVERY 6 HOURS PRN
Status: DISCONTINUED | OUTPATIENT
Start: 2021-08-03 | End: 2021-08-03 | Stop reason: HOSPADM

## 2021-08-03 RX ORDER — METOCLOPRAMIDE HYDROCHLORIDE 5 MG/ML
10 INJECTION INTRAMUSCULAR; INTRAVENOUS EVERY 6 HOURS PRN
Status: DISCONTINUED | OUTPATIENT
Start: 2021-08-03 | End: 2021-08-03 | Stop reason: HOSPADM

## 2021-08-03 RX ORDER — KETOROLAC TROMETHAMINE 30 MG/ML
30 INJECTION, SOLUTION INTRAMUSCULAR; INTRAVENOUS
Status: DISCONTINUED | OUTPATIENT
Start: 2021-08-03 | End: 2021-08-03

## 2021-08-03 RX ORDER — METOCLOPRAMIDE 10 MG/1
10 TABLET ORAL EVERY 6 HOURS PRN
Status: DISCONTINUED | OUTPATIENT
Start: 2021-08-03 | End: 2021-08-03 | Stop reason: HOSPADM

## 2021-08-03 RX ORDER — IBUPROFEN 600 MG/1
600 TABLET, FILM COATED ORAL
Status: DISCONTINUED | OUTPATIENT
Start: 2021-08-03 | End: 2021-08-03

## 2021-08-03 RX ORDER — CARBOPROST TROMETHAMINE 250 UG/ML
250 INJECTION, SOLUTION INTRAMUSCULAR
Status: DISCONTINUED | OUTPATIENT
Start: 2021-08-03 | End: 2021-08-03 | Stop reason: HOSPADM

## 2021-08-03 RX ORDER — MISOPROSTOL 200 UG/1
800 TABLET ORAL
Status: DISCONTINUED | OUTPATIENT
Start: 2021-08-03 | End: 2021-08-05 | Stop reason: HOSPADM

## 2021-08-03 RX ORDER — NALOXONE HYDROCHLORIDE 0.4 MG/ML
0.4 INJECTION, SOLUTION INTRAMUSCULAR; INTRAVENOUS; SUBCUTANEOUS
Status: DISCONTINUED | OUTPATIENT
Start: 2021-08-03 | End: 2021-08-03 | Stop reason: HOSPADM

## 2021-08-03 RX ORDER — TRANEXAMIC ACID 10 MG/ML
1 INJECTION, SOLUTION INTRAVENOUS EVERY 30 MIN PRN
Status: DISCONTINUED | OUTPATIENT
Start: 2021-08-03 | End: 2021-08-03 | Stop reason: HOSPADM

## 2021-08-03 RX ORDER — ACETAMINOPHEN 325 MG/1
650 TABLET ORAL EVERY 4 HOURS PRN
Status: DISCONTINUED | OUTPATIENT
Start: 2021-08-03 | End: 2021-08-05 | Stop reason: HOSPADM

## 2021-08-03 RX ORDER — MISOPROSTOL 200 UG/1
800 TABLET ORAL
Status: DISCONTINUED | OUTPATIENT
Start: 2021-08-03 | End: 2021-08-03 | Stop reason: HOSPADM

## 2021-08-03 RX ORDER — SODIUM CHLORIDE, SODIUM LACTATE, POTASSIUM CHLORIDE, CALCIUM CHLORIDE 600; 310; 30; 20 MG/100ML; MG/100ML; MG/100ML; MG/100ML
INJECTION, SOLUTION INTRAVENOUS CONTINUOUS
Status: DISCONTINUED | OUTPATIENT
Start: 2021-08-03 | End: 2021-08-03 | Stop reason: HOSPADM

## 2021-08-03 RX ORDER — HYDROCORTISONE 2.5 %
CREAM (GRAM) TOPICAL 3 TIMES DAILY PRN
Status: DISCONTINUED | OUTPATIENT
Start: 2021-08-03 | End: 2021-08-05 | Stop reason: HOSPADM

## 2021-08-03 RX ORDER — METHYLERGONOVINE MALEATE 0.2 MG/ML
200 INJECTION INTRAVENOUS
Status: DISCONTINUED | OUTPATIENT
Start: 2021-08-03 | End: 2021-08-03 | Stop reason: HOSPADM

## 2021-08-03 RX ORDER — MISOPROSTOL 200 UG/1
400 TABLET ORAL
Status: DISCONTINUED | OUTPATIENT
Start: 2021-08-03 | End: 2021-08-05 | Stop reason: HOSPADM

## 2021-08-03 RX ORDER — OXYTOCIN 10 [USP'U]/ML
10 INJECTION, SOLUTION INTRAMUSCULAR; INTRAVENOUS
Status: DISCONTINUED | OUTPATIENT
Start: 2021-08-03 | End: 2021-08-05 | Stop reason: HOSPADM

## 2021-08-03 RX ORDER — MODIFIED LANOLIN
OINTMENT (GRAM) TOPICAL
Status: DISCONTINUED | OUTPATIENT
Start: 2021-08-03 | End: 2021-08-05 | Stop reason: HOSPADM

## 2021-08-03 RX ORDER — IBUPROFEN 800 MG/1
800 TABLET, FILM COATED ORAL EVERY 6 HOURS PRN
Status: DISCONTINUED | OUTPATIENT
Start: 2021-08-03 | End: 2021-08-05 | Stop reason: HOSPADM

## 2021-08-03 RX ORDER — ONDANSETRON 2 MG/ML
4 INJECTION INTRAMUSCULAR; INTRAVENOUS EVERY 6 HOURS PRN
Status: DISCONTINUED | OUTPATIENT
Start: 2021-08-03 | End: 2021-08-03 | Stop reason: HOSPADM

## 2021-08-03 RX ORDER — OXYTOCIN 10 [USP'U]/ML
10 INJECTION, SOLUTION INTRAMUSCULAR; INTRAVENOUS
Status: DISCONTINUED | OUTPATIENT
Start: 2021-08-03 | End: 2021-08-03

## 2021-08-03 RX ORDER — OXYTOCIN/0.9 % SODIUM CHLORIDE 30/500 ML
100-340 PLASTIC BAG, INJECTION (ML) INTRAVENOUS CONTINUOUS PRN
Status: DISCONTINUED | OUTPATIENT
Start: 2021-08-03 | End: 2021-08-03

## 2021-08-03 RX ORDER — FERROUS SULFATE 324(65)MG
TABLET, DELAYED RELEASE (ENTERIC COATED) ORAL
COMMUNITY

## 2021-08-03 RX ORDER — OXYTOCIN 10 [USP'U]/ML
10 INJECTION, SOLUTION INTRAMUSCULAR; INTRAVENOUS
Status: DISCONTINUED | OUTPATIENT
Start: 2021-08-03 | End: 2021-08-03 | Stop reason: HOSPADM

## 2021-08-03 RX ORDER — PROCHLORPERAZINE 25 MG
25 SUPPOSITORY, RECTAL RECTAL EVERY 12 HOURS PRN
Status: DISCONTINUED | OUTPATIENT
Start: 2021-08-03 | End: 2021-08-03 | Stop reason: HOSPADM

## 2021-08-03 RX ORDER — ONDANSETRON 4 MG/1
4 TABLET, ORALLY DISINTEGRATING ORAL EVERY 6 HOURS PRN
Status: DISCONTINUED | OUTPATIENT
Start: 2021-08-03 | End: 2021-08-03 | Stop reason: HOSPADM

## 2021-08-03 RX ORDER — OXYTOCIN/0.9 % SODIUM CHLORIDE 30/500 ML
1-24 PLASTIC BAG, INJECTION (ML) INTRAVENOUS CONTINUOUS
Status: DISCONTINUED | OUTPATIENT
Start: 2021-08-03 | End: 2021-08-03 | Stop reason: HOSPADM

## 2021-08-03 RX ORDER — OXYTOCIN/0.9 % SODIUM CHLORIDE 30/500 ML
340 PLASTIC BAG, INJECTION (ML) INTRAVENOUS CONTINUOUS PRN
Status: COMPLETED | OUTPATIENT
Start: 2021-08-03 | End: 2021-08-04

## 2021-08-03 RX ORDER — CARBOPROST TROMETHAMINE 250 UG/ML
250 INJECTION, SOLUTION INTRAMUSCULAR
Status: DISCONTINUED | OUTPATIENT
Start: 2021-08-03 | End: 2021-08-05 | Stop reason: HOSPADM

## 2021-08-03 RX ADMIN — IBUPROFEN 600 MG: 600 TABLET ORAL at 23:24

## 2021-08-03 RX ADMIN — Medication 2 MILLI-UNITS/MIN: at 22:29

## 2021-08-03 RX ADMIN — METHYLERGONOVINE MALEATE 200 MCG: 0.2 INJECTION, SOLUTION INTRAMUSCULAR; INTRAVENOUS at 22:39

## 2021-08-03 RX ADMIN — FENTANYL CITRATE 100 MCG: 50 INJECTION, SOLUTION INTRAMUSCULAR; INTRAVENOUS at 20:28

## 2021-08-03 RX ADMIN — LIDOCAINE HYDROCHLORIDE 15 ML: 10 INJECTION, SOLUTION EPIDURAL; INFILTRATION; INTRACAUDAL; PERINEURAL at 22:36

## 2021-08-03 RX ADMIN — SODIUM CHLORIDE, POTASSIUM CHLORIDE, SODIUM LACTATE AND CALCIUM CHLORIDE: 600; 310; 30; 20 INJECTION, SOLUTION INTRAVENOUS at 20:27

## 2021-08-03 NOTE — PROVIDER NOTIFICATION
08/03/21 1717   Provider Notification   Provider Name/Title Dr. Navarro   Method of Notification In Department   Dr Jeniffer Navarro informed of patient arrival and assessment including the following:    Reason for maternal/fetal assessment leaking vaginal fluid. Pt reports leaking fluid starting at 1500. Fetal status normal baseline, moderate variability, accelerations absent and no decelerations. Plan per provider/orders received for fern test and ROM plus, and SVE.

## 2021-08-03 NOTE — PROVIDER NOTIFICATION
08/03/21 1809   Provider Notification   Provider Name/Title Dr. Navarro   Method of Notification Phone   Reported positive ROM plus test, SVE, and FHR and contraction pattern. Received admission orders.

## 2021-08-03 NOTE — PLAN OF CARE
Data: [facility] presented to [facility]: 8/3/2021  4:46 PM.  Reason for maternal/fetal assessment is leaking vaginal fluid. Patient reports that her water broke at 3pm. She started leaking and then took a shower and noticed more fluid.  Patient is a .  Prenatal record reviewed. Pregnancy has been uncomplicated..  Gestational Age 40w2d. VSS. Fetal movement active. Patient denies vaginal bleeding, abdominal pain, pelvic pressure, nausea, vomiting, headache, visual disturbances, epigastric or URQ pain, significant edema. Support person is present.   Action: Verbal consent for EFM. Triage assessment completed. Bill of rights reviewed.  Response: Patient verbalized agreement with plan. Will contact Dr Jeniffer Navarro with update and for further orders.

## 2021-08-04 LAB
HGB BLD-MCNC: 12.3 G/DL (ref 11.7–15.7)
T PALLIDUM AB SER QL: NONREACTIVE

## 2021-08-04 PROCEDURE — 120N000001 HC R&B MED SURG/OB

## 2021-08-04 PROCEDURE — 250N000009 HC RX 250: Performed by: OBSTETRICS & GYNECOLOGY

## 2021-08-04 PROCEDURE — 36415 COLL VENOUS BLD VENIPUNCTURE: CPT | Performed by: OBSTETRICS & GYNECOLOGY

## 2021-08-04 PROCEDURE — 250N000013 HC RX MED GY IP 250 OP 250 PS 637: Performed by: OBSTETRICS & GYNECOLOGY

## 2021-08-04 PROCEDURE — 85018 HEMOGLOBIN: CPT | Performed by: OBSTETRICS & GYNECOLOGY

## 2021-08-04 RX ADMIN — IBUPROFEN 800 MG: 800 TABLET, FILM COATED ORAL at 05:10

## 2021-08-04 RX ADMIN — IBUPROFEN 800 MG: 800 TABLET, FILM COATED ORAL at 23:19

## 2021-08-04 RX ADMIN — ACETAMINOPHEN 650 MG: 325 TABLET, FILM COATED ORAL at 12:46

## 2021-08-04 RX ADMIN — IBUPROFEN 800 MG: 800 TABLET, FILM COATED ORAL at 16:58

## 2021-08-04 RX ADMIN — ACETAMINOPHEN 650 MG: 325 TABLET, FILM COATED ORAL at 23:19

## 2021-08-04 RX ADMIN — Medication 100 ML/HR: at 00:12

## 2021-08-04 RX ADMIN — IBUPROFEN 800 MG: 800 TABLET, FILM COATED ORAL at 11:00

## 2021-08-04 RX ADMIN — DOCUSATE SODIUM 100 MG: 100 CAPSULE, LIQUID FILLED ORAL at 08:52

## 2021-08-04 RX ADMIN — ACETAMINOPHEN 650 MG: 325 TABLET, FILM COATED ORAL at 08:43

## 2021-08-04 RX ADMIN — ACETAMINOPHEN 650 MG: 325 TABLET, FILM COATED ORAL at 16:59

## 2021-08-04 NOTE — PLAN OF CARE
Data: Vital signs within normal limits. Postpartum checks within normal limits - see flow record. Patient eating and drinking normally. Patient able to empty bladder independently and is up ambulating. No apparent signs of infection. Incision and laceration healing well. Patient performing self cares and is able to care for infant.  Action: Patient medicated during the shift for perineal pain. See MAR. Patient reassessed within 1 hour after each medication and pain was improved - patient stated she was comfortable. Patient education done about self and infant cares. See flow record.  Response: Positive attachment behaviors observed with infant. Support persons was present.   Plan: Anticipate discharge on 8/5.

## 2021-08-04 NOTE — PLAN OF CARE
Data: Marce Krishnan transferred to [facility] via wheelchair at 0115. Baby transferred via parent's arms.  Action: Receiving unit notified of transfer: Yes. Patient and family notified of room change. Report given to Georgina IBRAHIM at 0115. Belongings sent to receiving unit. Accompanied by Registered Nurse. Oriented patient to surroundings. Call light within reach. ID bands double-checked with receiving RN.  Response: Patient tolerated transfer and is stable.     Patients mobililty level scored using the bedside mobility assistance tool (BMAT). Patient is at a mobility level test number: 4. Mobility equipment used: none required. Required assist of 0 staff members. Further use of BMAT scoring not required.

## 2021-08-04 NOTE — PROGRESS NOTES
OB Post-partum Note  PPD# 1    S:  Patient doing well.  Pain controlled.  Voiding.  Bleeding is normal.  Breast feeding. Mild sore throat, Covid negative.     O:  BP 97/45   Pulse 89   Temp 98.8  F (37.1  C) (Oral)   Resp 18   Wt 85.3 kg (188 lb)   LMP 10/01/2020   Breastfeeding Unknown   BMI 31.28 kg/m    Gen- A&O, NAD  Abd- Non-tender, fundus non tender and firm   Ext- non-tender, no calf pain    Hemoglobin   Date Value Ref Range Status   2021 12.3 11.7 - 15.7 g/dL Final   2021 13.0 11.7 - 15.7 g/dL Final     O positive  Rubella Immune    A/P: 31 year old  PPD# 1 s/p .  Doing well post partum and should be prepared for discharge tomorrow.     1.  Routine post-partum cares  2.  Analgesia with Ibuprofen and Tylenol  3.  Discharge is anticipated tomorrow  4.  The plan of care was discussed with the patient.  She expressed understanding and agreemen      Joaquim Huertas MD  2021  8:10 AM

## 2021-08-04 NOTE — H&P
OB Brief Admit H&P    No significant change in general health status based on examination of the patient, review of Nursing Admission Database and prenatal record.    Pt is a 31 year old  @ 40w2d who presented to L&D with complaints of leakage of fluid that started around 3pm. On admission, ROM + was positive and the patient continued to leak a small amount of meconium fluid. Cervix was 4/70/-2 on admission.     Patient's prenatal course has been complicated by: Intermittent hives, managed by derm    Prenatal Labs:    Blood type O+  Rubella immune  GCT98  GBS neg    EFW: 7.5lb    /55   Pulse 100   Temp 98  F (36.7  C) (Oral)   Resp 18   Wt 85.3 kg (188 lb)   LMP 10/01/2020   BMI 31.28 kg/m    EFM:  Baseline 125, moderate variability, + accels, no decels, Cat I  Hunker: every 2-4min  SVE: 4/70%/-2  Membranes:  ruptured    Assessment:  31 year old  @ 40w2d admitted for spontaneous ROM, early labor    Plan:  1. Admit to labor and delivery   2. Desires medication free labor, possible fentanyl  3. Anticipate     Jeniffer Navarro MD  8/3/2021  11:17 PM

## 2021-08-04 NOTE — PROVIDER NOTIFICATION
08/03/21 2208   Provider Notification   Provider Name/Title Dr Navarro   Method of Notification At Bedside   MD at bedside for delivery.

## 2021-08-04 NOTE — PLAN OF CARE
Data: Vital signs within normal limits. Postpartum checks within normal limits - see flow record. Patient eating and drinking normally. Patient able to empty bladder independently and is up ambulating. No apparent signs of infection. Patient performing self cares, is able to care for infant and is breastfeeding every 2-3 hours.   Incision from cyst removal  appears within normal. Is using ice for perineal discomfort.  Rested in bed this shift.  Action: Patient medicated during the shift for pain. See MAR. Patient reassessed within 1 hour after each medication and pain was improved - patient stated she was comfortable. Patient education done, see flow record.  Response: Positive attachment behaviors observed with infant. Patient's  present this shift.   Plan: Continue current plan of care.

## 2021-08-04 NOTE — PROVIDER NOTIFICATION
08/03/21 2145   Provider Notification   Provider Name/Title Dr Navarro   Method of Notification Phone   Request Attend Delivery   Notification Reason SVE   MD notified of SVE 9.5/100/0, patient feeling pushy with contractions. Will come now for delivery.

## 2021-08-04 NOTE — PROVIDER NOTIFICATION
08/03/21 1949   Provider Notification   Provider Name/Title Dr. Navarro   Method of Notification Phone   Request Evaluate - Remote   Notification Reason Status Update;SVE   MD updated on patient's labor status and SVE 5/90/-1. UC's 2-4 min apart and patient reports theyre getting stronger. Fluid is meconium stained. MD OK if patient wants IV Fentanyl and/or epidural. MD OK with negative covid result from external lab from 8/2. No need for further test.

## 2021-08-04 NOTE — PLAN OF CARE
Patient transferred over around 0100. Oriented to room, call light, and what to expect during stay. Vitals signs stable; patient has lower blood pressures which seem to be her baseline, denies dizziness or lightheadedness. Encouraged to drink fluids.  Ambulating independently to bathroom with steady gait. Pain controlled with Ibuprofen with good relief. Breastfeeding independently. Spouse at bedside and supportive. Bonding well with baby.     Patients mobililty level scored using the bedside mobility assistance tool (BMAT). Patient is at a mobility level test number: 4. Mobility equipment used: none required. Required assist of 0 staff members. Further use of BMAT scoring not required.

## 2021-08-05 VITALS
WEIGHT: 188 LBS | TEMPERATURE: 98.3 F | SYSTOLIC BLOOD PRESSURE: 105 MMHG | HEART RATE: 78 BPM | BODY MASS INDEX: 31.28 KG/M2 | DIASTOLIC BLOOD PRESSURE: 55 MMHG | RESPIRATION RATE: 16 BRPM

## 2021-08-05 PROCEDURE — 250N000013 HC RX MED GY IP 250 OP 250 PS 637: Performed by: OBSTETRICS & GYNECOLOGY

## 2021-08-05 RX ADMIN — ACETAMINOPHEN 650 MG: 325 TABLET, FILM COATED ORAL at 10:58

## 2021-08-05 RX ADMIN — ACETAMINOPHEN 650 MG: 325 TABLET, FILM COATED ORAL at 06:43

## 2021-08-05 RX ADMIN — IBUPROFEN 800 MG: 800 TABLET, FILM COATED ORAL at 06:43

## 2021-08-05 RX ADMIN — DOCUSATE SODIUM 100 MG: 100 CAPSULE, LIQUID FILLED ORAL at 09:54

## 2021-08-05 NOTE — PROGRESS NOTES
Post-partum Note      S: Patient is doing well today.  Pain is controlled with PO medications.  Tolerating regular diet without nausea or vomiting.  Ambulating without dizziness.  Urinating without difficulty. Lochia normal.  Breastfeeding.    O:   Patient Vitals for the past 24 hrs:   BP Temp Temp src Pulse Resp   21 2316 111/61 98.4  F (36.9  C) Oral 80 16   21 1651 104/55 98.6  F (37  C) Oral 73 16   21 0834 109/59 98.7  F (37.1  C) Oral 75 18     Gen:  Resting comfortably, NAD  Pulm:  Breathing comfortably on room air  Abd:  Soft, appropriately ttp, non-distended.Fundus below umbilicus, firm and non-tender.  Ext:  non-tender, no bilateral LE edema    I/O last 3 completed shifts:  In: 550 [P.O.:550]  Out: -     Hgb:   Hemoglobin   Date Value Ref Range Status   2021 12.3 11.7 - 15.7 g/dL Final   2021 13.0 11.7 - 15.7 g/dL Final       Assessment/Plan:  31 year old  on PPD #2 s/p .  1. Continue with routine postpartum management  2. Rh: Positive  3. Feed: Breastfeeding  Dispo: DC home today. Warning signs reviewed. Follow-up in 6 weeks. She has ibuprofen and tylenol at home, does not require rx.     MD Ayad Mcguire [facility]  2021, 8:10 AM

## 2021-08-05 NOTE — LACTATION NOTE
Lactation visit. Patient stated breastfeeding going well but infant was fussy with last feeding at 1800. Discussed ways to calm infant before feeding,  feeding behavior, and normal course of lactation. Patient  first child without issues. Encouraged to call for assistance as needed.

## 2021-08-05 NOTE — LACTATION NOTE
"This note was copied from a baby's chart.  Lactation visit. This is Marce's second infant, she reports nursing her first for 8 months and that it \"went well\".  Infant latched in cradle hold on L breast at time of visit, swallows heard and pointed out to Marce. She has a breast pump at home and is off work until November. Discussed when to initiate pumping/bottling. Marce has no additional questions. Encouraged her to call out for lactation support as needed prior to discharge today.   "

## 2021-08-05 NOTE — PLAN OF CARE
Vitals stable. Independent with cares. Breastfeeding independently. Encouraged to call if assistance is needed. Spouse at bedside and supportive.

## 2021-08-05 NOTE — PLAN OF CARE
Patient stable and discharging to home with  and . AVS reviewed and all questions answered. Declined breast pump. No medications for discharge. F/U in 6 weeks. All belongings sent home with patient. Ambulated to exit with staff escort at 1210.

## 2021-08-06 LAB
PATH REPORT.COMMENTS IMP SPEC: NORMAL
PATH REPORT.COMMENTS IMP SPEC: NORMAL
PATH REPORT.FINAL DX SPEC: NORMAL
PATH REPORT.GROSS SPEC: NORMAL
PATH REPORT.MICROSCOPIC SPEC OTHER STN: NORMAL
PATH REPORT.RELEVANT HX SPEC: NORMAL
PHOTO IMAGE: NORMAL

## 2021-08-06 PROCEDURE — 88305 TISSUE EXAM BY PATHOLOGIST: CPT | Mod: 26

## 2022-03-09 NOTE — L&D DELIVERY NOTE
OB Delivery Summary      HISTORY OF PRESENT ILLNESS:   with  IUP @ 40w2d who presented with complaints of leakage of fluid that started around 3pm. On admission, ROM + was positive and the patient continued to leak a small amount of meconium fluid. Cervix was 4/70/-2 on admission.      Patient's prenatal course has been complicated by: Intermittent hives, managed by derm     Prenatal Labs:    Blood type O+  Rubella immune  GCT98  GBS neg      Estimated fetal weight on admission was approximately 7.5lb        FIRST STAGE OF LABOR:  The patient was admitted to Labor and Delivery with a fetal heart rate tracing that was category I. Cervix was 4/70/-2. She gradually progressed on her own and received a dose of IV Fentanyl prior to delivery. She became completely dilated at 22:00.       SECOND STAGE OF LABOR:  The patient began pushing at 22:12pm.  She gradually brought the vertex down in the birth canal and delivered a viable male infant at 22:31pm from the ROP position on 8/3/2021.  After delivery of the head, the anterior shoulder and body delivered without difficulty.  A single nuchal cord was noted. The infant was placed on the mother's chest.  Delayed cord clamping was performed.        THIRD STAGE OF LABOR:  The cord was clamped and cut. The placenta then delivered spontaneously and appeared intact with a 3-vessel cord at 22:34.  Pitocin was started and fundal massage was performed.  Perineum was inspected and a second degree laceration was noted. It was repaired with a running suture of 3-0 Vicryl. The remainder of the genital tract was inspected, and a 1cm mobile right labial cyst was noted. With the patient's consent this was removed. The area was numbed with 1% plain lidocaine. A scalpel was used to incise the skin and cyst capsule and the mass was removed intact and sent to pathology. The incision was then closed with interrupted sutures of 4-0 Vicryl. Quantitative blood loss for the delivery was  289cc.       Both mother and baby tolerated delivery well and there were no complications. Fetal weight was PENDING and Apgars were 8 and 9 at 1 and 5 minutes, respectively.       Jeniffer Navarro MD  8/3/2021  11:13 PM    Include Location In Plan?: No Detail Level: Zone Detail Level: Generalized

## 2023-12-17 ENCOUNTER — HEALTH MAINTENANCE LETTER (OUTPATIENT)
Age: 33
End: 2023-12-17